# Patient Record
Sex: FEMALE | Race: WHITE | NOT HISPANIC OR LATINO | Employment: OTHER | URBAN - METROPOLITAN AREA
[De-identification: names, ages, dates, MRNs, and addresses within clinical notes are randomized per-mention and may not be internally consistent; named-entity substitution may affect disease eponyms.]

---

## 2018-06-08 NOTE — PATIENT DISCUSSION
Update 06/26/2018:  Patient does not like DVO cl and wants to trial MF cl per Lenore Chino in [facility].  Order 2 boxes each eye for patient to  without appointment.  Schedule CL FU then.

## 2018-08-09 ENCOUNTER — ESTABLISHED COMPREHENSIVE EXAM (OUTPATIENT)
Dept: URBAN - METROPOLITAN AREA CLINIC 43 | Facility: CLINIC | Age: 63
End: 2018-08-09

## 2018-08-09 DIAGNOSIS — H04.123: ICD-10-CM

## 2018-08-09 DIAGNOSIS — H40.013: ICD-10-CM

## 2018-08-09 DIAGNOSIS — H25.813: ICD-10-CM

## 2018-08-09 PROCEDURE — 92133 CPTRZD OPH DX IMG PST SGM ON: CPT

## 2018-08-09 PROCEDURE — G8785 BP SCRN NO PERF AT INTERVAL: HCPCS

## 2018-08-09 PROCEDURE — 1036F TOBACCO NON-USER: CPT

## 2018-08-09 PROCEDURE — G9903 PT SCRN TBCO ID AS NON USER: HCPCS

## 2018-08-09 PROCEDURE — 92014 COMPRE OPH EXAM EST PT 1/>: CPT

## 2018-08-09 PROCEDURE — G8427 DOCREV CUR MEDS BY ELIG CLIN: HCPCS

## 2018-08-09 ASSESSMENT — VISUAL ACUITY
OD_CC: 20/25-1
OD_SC: 20/100+1
OS_SC: <J8
OS_CC: J5+
OD_CC: J5+
OD_SC: <J8
OS_SC: 20/80-1
OS_CC: 20/20-2

## 2018-08-09 ASSESSMENT — TONOMETRY
OD_IOP_MMHG: 19
OS_IOP_MMHG: 21

## 2018-08-16 ENCOUNTER — CLINIC PROCEDURE ONLY (OUTPATIENT)
Dept: URBAN - METROPOLITAN AREA CLINIC 43 | Facility: CLINIC | Age: 63
End: 2018-08-16

## 2018-08-16 DIAGNOSIS — H40.013: ICD-10-CM

## 2018-08-16 PROCEDURE — 65855 TRABECULOPLASTY LASER SURG: CPT

## 2018-08-16 RX ORDER — PREDNISOLONE ACETATE 10 MG/ML: 1 SUSPENSION/ DROPS OPHTHALMIC

## 2018-08-16 ASSESSMENT — VISUAL ACUITY
OD_CC: 20/25-2
OS_CC: 20/20-2

## 2018-08-16 ASSESSMENT — TONOMETRY
OS_IOP_MMHG: 20
OD_IOP_MMHG: 17

## 2018-09-13 ENCOUNTER — IOP CHECK (OUTPATIENT)
Dept: URBAN - METROPOLITAN AREA CLINIC 43 | Facility: CLINIC | Age: 63
End: 2018-09-13

## 2018-09-13 DIAGNOSIS — H40.1131: ICD-10-CM

## 2018-09-13 PROCEDURE — G8785 BP SCRN NO PERF AT INTERVAL: HCPCS

## 2018-09-13 PROCEDURE — 65855 TRABECULOPLASTY LASER SURG: CPT

## 2018-09-13 PROCEDURE — 1036F TOBACCO NON-USER: CPT

## 2018-09-13 PROCEDURE — G8428 CUR MEDS NOT DOCUMENT: HCPCS

## 2018-09-13 PROCEDURE — 2027F OPTIC NERVE HEAD EVAL DONE: CPT

## 2018-09-13 PROCEDURE — 92012 INTRM OPH EXAM EST PATIENT: CPT

## 2018-09-13 PROCEDURE — G9903 PT SCRN TBCO ID AS NON USER: HCPCS

## 2018-09-13 RX ORDER — PREDNISOLONE ACETATE 10 MG/ML: 1 SUSPENSION/ DROPS OPHTHALMIC

## 2018-09-13 ASSESSMENT — VISUAL ACUITY
OS_CC: 20/20-1
OD_CC: 20/25-1

## 2018-09-13 ASSESSMENT — TONOMETRY
OD_IOP_MMHG: 18
OS_IOP_MMHG: 18

## 2019-01-15 ENCOUNTER — IOP CHECK (OUTPATIENT)
Dept: URBAN - METROPOLITAN AREA CLINIC 43 | Facility: CLINIC | Age: 64
End: 2019-01-15

## 2019-01-15 DIAGNOSIS — H40.1131: ICD-10-CM

## 2019-01-15 PROCEDURE — 2027F OPTIC NERVE HEAD EVAL DONE: CPT

## 2019-01-15 PROCEDURE — 3284F IOP RED >=15% PRE-NTRV LVL: CPT

## 2019-01-15 PROCEDURE — 1036F TOBACCO NON-USER: CPT

## 2019-01-15 PROCEDURE — G8785 BP SCRN NO PERF AT INTERVAL: HCPCS

## 2019-01-15 PROCEDURE — G9903 PT SCRN TBCO ID AS NON USER: HCPCS

## 2019-01-15 PROCEDURE — 92012 INTRM OPH EXAM EST PATIENT: CPT

## 2019-01-15 PROCEDURE — G8428 CUR MEDS NOT DOCUMENT: HCPCS

## 2019-01-15 ASSESSMENT — TONOMETRY
OD_IOP_MMHG: 14
OS_IOP_MMHG: 15

## 2019-01-15 ASSESSMENT — VISUAL ACUITY
OS_CC: 20/25
OD_CC: 20/25+2

## 2019-07-23 ENCOUNTER — IOP CHECK (OUTPATIENT)
Dept: URBAN - METROPOLITAN AREA CLINIC 43 | Facility: CLINIC | Age: 64
End: 2019-07-23

## 2019-07-23 DIAGNOSIS — H40.1131: ICD-10-CM

## 2019-07-23 PROCEDURE — 92012 INTRM OPH EXAM EST PATIENT: CPT

## 2019-07-23 PROCEDURE — 92083 EXTENDED VISUAL FIELD XM: CPT

## 2019-07-23 ASSESSMENT — VISUAL ACUITY
OS_CC: 20/25+1
OD_CC: 20/30-2

## 2019-07-23 ASSESSMENT — TONOMETRY
OS_IOP_MMHG: 15
OD_IOP_MMHG: 15

## 2020-01-23 ENCOUNTER — ESTABLISHED COMPREHENSIVE EXAM (OUTPATIENT)
Dept: URBAN - METROPOLITAN AREA CLINIC 43 | Facility: CLINIC | Age: 65
End: 2020-01-23

## 2020-01-23 DIAGNOSIS — H04.123: ICD-10-CM

## 2020-01-23 DIAGNOSIS — H25.813: ICD-10-CM

## 2020-01-23 DIAGNOSIS — H40.1131: ICD-10-CM

## 2020-01-23 DIAGNOSIS — H40.033: ICD-10-CM

## 2020-01-23 PROCEDURE — 92250 FUNDUS PHOTOGRAPHY W/I&R: CPT

## 2020-01-23 PROCEDURE — 92014 COMPRE OPH EXAM EST PT 1/>: CPT

## 2020-01-23 RX ORDER — PREDNISOLONE ACETATE 10 MG/ML: 1 SUSPENSION/ DROPS OPHTHALMIC

## 2020-01-23 ASSESSMENT — VISUAL ACUITY
OD_SC: 20/200
OS_SC: J12
OD_BAT: 20/60
OD_SC: J12
OD_CC: J3
OS_SC: 20/100
OS_CC: 20/25-1
OD_CC: 20/30-2
OS_BAT: 20/60
OS_CC: J2

## 2020-01-23 ASSESSMENT — TONOMETRY
OS_IOP_MMHG: 20
OD_IOP_MMHG: 19

## 2020-02-25 ENCOUNTER — CLINIC PROCEDURE ONLY (OUTPATIENT)
Dept: URBAN - METROPOLITAN AREA CLINIC 43 | Facility: CLINIC | Age: 65
End: 2020-02-25

## 2020-02-25 DIAGNOSIS — H40.1131: ICD-10-CM

## 2020-02-25 PROCEDURE — 6585550 LASER TRABECULOPLASTY

## 2020-02-25 ASSESSMENT — TONOMETRY
OD_IOP_MMHG: 19
OS_IOP_MMHG: 19

## 2020-02-25 ASSESSMENT — VISUAL ACUITY
OS_CC: 20/20
OD_CC: 20/30-2

## 2020-12-01 ENCOUNTER — DILATED FUNDUS EXAM (OUTPATIENT)
Dept: URBAN - METROPOLITAN AREA CLINIC 43 | Facility: CLINIC | Age: 65
End: 2020-12-01

## 2020-12-01 DIAGNOSIS — H25.813: ICD-10-CM

## 2020-12-01 DIAGNOSIS — H40.033: ICD-10-CM

## 2020-12-01 DIAGNOSIS — H40.1131: ICD-10-CM

## 2020-12-01 DIAGNOSIS — H04.123: ICD-10-CM

## 2020-12-01 PROCEDURE — 92012 INTRM OPH EXAM EST PATIENT: CPT

## 2020-12-01 PROCEDURE — 92133 CPTRZD OPH DX IMG PST SGM ON: CPT

## 2020-12-01 PROCEDURE — 92202 OPSCPY EXTND ON/MAC DRAW: CPT

## 2020-12-01 ASSESSMENT — VISUAL ACUITY
OD_CC: 20/30-2
OS_CC: 20/25+2

## 2020-12-01 ASSESSMENT — TONOMETRY
OS_IOP_MMHG: 17
OD_IOP_MMHG: 18

## 2021-12-09 ENCOUNTER — COMPREHENSIVE EXAM (OUTPATIENT)
Dept: URBAN - METROPOLITAN AREA CLINIC 43 | Facility: CLINIC | Age: 66
End: 2021-12-09

## 2021-12-09 DIAGNOSIS — H40.1131: ICD-10-CM

## 2021-12-09 DIAGNOSIS — H40.033: ICD-10-CM

## 2021-12-09 PROCEDURE — 92014 COMPRE OPH EXAM EST PT 1/>: CPT

## 2021-12-09 PROCEDURE — 92250 FUNDUS PHOTOGRAPHY W/I&R: CPT

## 2021-12-09 ASSESSMENT — VISUAL ACUITY
OD_CC: 20/50
OD_SC: 20/200
OD_SC: J12
OS_CC: 20/30
OS_SC: <J12
OD_BAT: 20/60
OS_SC: 20/400
OS_CC: J2
OS_BAT: 20/60
OD_PH: 20/30
OD_CC: J3

## 2021-12-09 ASSESSMENT — TONOMETRY
OD_IOP_MMHG: 16
OS_IOP_MMHG: 16

## 2022-04-07 ENCOUNTER — CONSULTATION/EVALUATION (OUTPATIENT)
Dept: URBAN - METROPOLITAN AREA CLINIC 43 | Facility: CLINIC | Age: 67
End: 2022-04-07

## 2022-04-07 DIAGNOSIS — H18.513: ICD-10-CM

## 2022-04-07 DIAGNOSIS — H40.033: ICD-10-CM

## 2022-04-07 DIAGNOSIS — H40.1131: ICD-10-CM

## 2022-04-07 DIAGNOSIS — H25.813: ICD-10-CM

## 2022-04-07 PROCEDURE — 92025-3 CORNEAL TOPO, REFUSED

## 2022-04-07 PROCEDURE — 92014 COMPRE OPH EXAM EST PT 1/>: CPT

## 2022-04-07 PROCEDURE — 92286 ANT SGM IMG I&R SPECLR MIC: CPT

## 2022-04-07 PROCEDURE — 92136TC INTERFEROMETRY - TECHNICAL COMPONENT

## 2022-04-07 RX ORDER — NEPAFENAC 3 MG/ML: 1 SUSPENSION/ DROPS OPHTHALMIC ONCE A DAY

## 2022-04-07 RX ORDER — PREDNISOLONE ACETATE 10 MG/ML: 1 SUSPENSION/ DROPS OPHTHALMIC

## 2022-04-07 RX ORDER — MOXIFLOXACIN HYDROCHLORIDE 5 MG/ML: 1 SOLUTION/ DROPS OPHTHALMIC

## 2022-04-07 ASSESSMENT — TONOMETRY
OD_IOP_MMHG: 18
OS_IOP_MMHG: 18

## 2022-04-07 ASSESSMENT — VISUAL ACUITY
OD_SC: <J12
OS_CC: 20/40
OS_CC: J3
OD_SC: 20/200
OD_CC: J4
OD_CC: 20/50
OD_RAM: 20/20
OS_SC: <J12
OD_BAT: 20/200
OS_BAT: 20/200
OS_AM: 20/20
OS_SC: 20/200

## 2023-01-26 ENCOUNTER — COMPREHENSIVE EXAM (OUTPATIENT)
Dept: URBAN - METROPOLITAN AREA CLINIC 43 | Facility: CLINIC | Age: 68
End: 2023-01-26

## 2023-01-26 DIAGNOSIS — H40.1131: ICD-10-CM

## 2023-01-26 DIAGNOSIS — H25.813: ICD-10-CM

## 2023-01-26 PROCEDURE — 92014 COMPRE OPH EXAM EST PT 1/>: CPT

## 2023-01-26 PROCEDURE — 92250 FUNDUS PHOTOGRAPHY W/I&R: CPT

## 2023-01-26 ASSESSMENT — TONOMETRY
OD_IOP_MMHG: 19
OS_IOP_MMHG: 18

## 2023-01-26 ASSESSMENT — VISUAL ACUITY
OD_BAT: 20/200
OD_SC: 20/400
OS_AM: 20/20
OD_SC: < J12
OS_CC: 20/25
OD_CC: J6
OS_CC: J6
OD_CC: 20/40+1
OS_SC: <J12
OS_SC: 20/400
OD_PH: 20/25-1
OD_RAM: 20/20
OS_BAT: 20/200

## 2023-04-20 ENCOUNTER — POST-OP (OUTPATIENT)
Dept: URBAN - METROPOLITAN AREA CLINIC 43 | Facility: CLINIC | Age: 68
End: 2023-04-20

## 2023-04-20 DIAGNOSIS — H40.1131: ICD-10-CM

## 2023-04-20 DIAGNOSIS — Z96.1: ICD-10-CM

## 2023-04-20 PROCEDURE — 99024 POSTOP FOLLOW-UP VISIT: CPT

## 2023-04-20 RX ORDER — OLOPATADINE HYDROCHLORIDE 2 MG/ML: 1 SOLUTION OPHTHALMIC ONCE A DAY

## 2023-04-20 ASSESSMENT — VISUAL ACUITY
OS_CC: 20/30
OU_CC: 20/30+2
OD_CC: 20/30-2
OU_CC: J3
OD_CC: J6
OS_CC: J6

## 2023-04-20 ASSESSMENT — TONOMETRY
OD_IOP_MMHG: 23
OS_IOP_MMHG: 22

## 2023-05-26 ENCOUNTER — POST-OP (OUTPATIENT)
Dept: URBAN - METROPOLITAN AREA CLINIC 43 | Facility: CLINIC | Age: 68
End: 2023-05-26

## 2023-05-26 DIAGNOSIS — H40.1131: ICD-10-CM

## 2023-05-26 DIAGNOSIS — Z96.1: ICD-10-CM

## 2023-05-26 PROCEDURE — 99024 POSTOP FOLLOW-UP VISIT: CPT

## 2023-05-26 ASSESSMENT — VISUAL ACUITY
OD_CC: J4
OS_CC: J3
OD_CC: 20/30
OU_CC: 20/20-1
OS_CC: 20/30

## 2023-05-26 ASSESSMENT — TONOMETRY
OS_IOP_MMHG: 16
OD_IOP_MMHG: 17

## 2023-09-28 ENCOUNTER — COMPREHENSIVE EXAM (OUTPATIENT)
Dept: URBAN - METROPOLITAN AREA CLINIC 43 | Facility: CLINIC | Age: 68
End: 2023-09-28

## 2023-09-28 DIAGNOSIS — H26.493: ICD-10-CM

## 2023-09-28 DIAGNOSIS — H40.1131: ICD-10-CM

## 2023-09-28 DIAGNOSIS — H04.123: ICD-10-CM

## 2023-09-28 PROCEDURE — 92083 EXTENDED VISUAL FIELD XM: CPT

## 2023-09-28 PROCEDURE — 92250 FUNDUS PHOTOGRAPHY W/I&R: CPT

## 2023-09-28 PROCEDURE — 92015 DETERMINE REFRACTIVE STATE: CPT

## 2023-09-28 PROCEDURE — 92014 COMPRE OPH EXAM EST PT 1/>: CPT

## 2023-09-28 ASSESSMENT — VISUAL ACUITY
OD_SC: 20/50
OD_CC: 20/20
OD_SC: J6
OS_SC: 20/60
OD_CC: J2
OS_CC: J3
OD_BAT: 20/30
OS_CC: 20/20
OS_BAT: 20/30
OS_SC: J8

## 2023-09-28 ASSESSMENT — TONOMETRY
OD_IOP_MMHG: 16
OS_IOP_MMHG: 16

## 2024-04-25 ENCOUNTER — PREPPED CHART (OUTPATIENT)
Dept: URBAN - METROPOLITAN AREA CLINIC 43 | Facility: CLINIC | Age: 69
End: 2024-04-25

## 2024-05-23 ENCOUNTER — FOLLOW UP (OUTPATIENT)
Dept: URBAN - METROPOLITAN AREA CLINIC 43 | Facility: CLINIC | Age: 69
End: 2024-05-23

## 2024-05-23 DIAGNOSIS — H04.123: ICD-10-CM

## 2024-05-23 DIAGNOSIS — H40.1131: ICD-10-CM

## 2024-05-23 PROCEDURE — 92133 CPTRZD OPH DX IMG PST SGM ON: CPT

## 2024-05-23 PROCEDURE — 92012 INTRM OPH EXAM EST PATIENT: CPT

## 2024-05-23 ASSESSMENT — TONOMETRY
OD_IOP_MMHG: 14
OS_IOP_MMHG: 16

## 2024-05-23 ASSESSMENT — VISUAL ACUITY
OS_CC: 20/25
OD_CC: 20/20

## 2024-11-21 ENCOUNTER — COMPREHENSIVE EXAM (OUTPATIENT)
Dept: URBAN - METROPOLITAN AREA CLINIC 43 | Facility: CLINIC | Age: 69
End: 2024-11-21

## 2024-11-21 DIAGNOSIS — H40.1131: ICD-10-CM

## 2024-11-21 DIAGNOSIS — H04.123: ICD-10-CM

## 2024-11-21 PROCEDURE — 92250 FUNDUS PHOTOGRAPHY W/I&R: CPT

## 2024-11-21 PROCEDURE — 92014 COMPRE OPH EXAM EST PT 1/>: CPT
